# Patient Record
Sex: FEMALE | Race: BLACK OR AFRICAN AMERICAN
[De-identification: names, ages, dates, MRNs, and addresses within clinical notes are randomized per-mention and may not be internally consistent; named-entity substitution may affect disease eponyms.]

---

## 2018-07-20 ENCOUNTER — HOSPITAL ENCOUNTER (EMERGENCY)
Dept: HOSPITAL 26 - MED | Age: 28
Discharge: HOME | End: 2018-07-20
Payer: COMMERCIAL

## 2018-07-20 VITALS — DIASTOLIC BLOOD PRESSURE: 95 MMHG | SYSTOLIC BLOOD PRESSURE: 138 MMHG

## 2018-07-20 VITALS — WEIGHT: 145 LBS | HEIGHT: 63 IN | BODY MASS INDEX: 25.69 KG/M2

## 2018-07-20 VITALS — DIASTOLIC BLOOD PRESSURE: 108 MMHG | SYSTOLIC BLOOD PRESSURE: 153 MMHG

## 2018-07-20 DIAGNOSIS — M26.601: ICD-10-CM

## 2018-07-20 DIAGNOSIS — I10: ICD-10-CM

## 2018-07-20 DIAGNOSIS — M62.838: Primary | ICD-10-CM

## 2018-07-20 PROCEDURE — 99283 EMERGENCY DEPT VISIT LOW MDM: CPT

## 2018-07-20 PROCEDURE — 96372 THER/PROPH/DIAG INJ SC/IM: CPT

## 2018-07-20 NOTE — NUR
Patient discharged with v/s stable. Written and verbal after care instructions 
given and explained. 

Patient alert, oriented and verbalized understanding of instructions. 
Ambulatory with steady gait. All questions addressed prior to discharge. ID 
band removed. Patient advised to follow up with PMD. Rx of ROBAXIN/  ---NO 
NORCO GIVEN AS PT REFUSED given. Patient educated on indication of medication 
including possible reaction and side effects. Opportunity to ask questions 
provided and answered.

## 2020-04-15 ENCOUNTER — HOSPITAL ENCOUNTER (EMERGENCY)
Dept: HOSPITAL 26 - MED | Age: 30
Discharge: HOME | End: 2020-04-15
Payer: COMMERCIAL

## 2020-04-15 VITALS — SYSTOLIC BLOOD PRESSURE: 146 MMHG | DIASTOLIC BLOOD PRESSURE: 116 MMHG

## 2020-04-15 VITALS — DIASTOLIC BLOOD PRESSURE: 94 MMHG | SYSTOLIC BLOOD PRESSURE: 140 MMHG

## 2020-04-15 VITALS — BODY MASS INDEX: 29.06 KG/M2 | HEIGHT: 63 IN | WEIGHT: 164 LBS

## 2020-04-15 DIAGNOSIS — N39.0: Primary | ICD-10-CM

## 2020-04-15 DIAGNOSIS — I10: ICD-10-CM

## 2020-04-15 LAB
ALBUMIN FLD-MCNC: 3.9 G/DL (ref 3.4–5)
ANION GAP SERPL CALCULATED.3IONS-SCNC: 12.4 MMOL/L (ref 8–16)
APPEARANCE UR: CLEAR
AST SERPL-CCNC: 25 U/L (ref 15–37)
BASOPHILS # BLD AUTO: 0.1 K/UL (ref 0–0.22)
BASOPHILS NFR BLD AUTO: 1.1 % (ref 0–2)
BILIRUB SERPL-MCNC: 0.8 MG/DL (ref 0–1)
BILIRUB UR QL STRIP: NEGATIVE
BUN SERPL-MCNC: 11 MG/DL (ref 7–18)
CHLORIDE SERPL-SCNC: 101 MMOL/L (ref 98–107)
CO2 SERPL-SCNC: 27.5 MMOL/L (ref 21–32)
COLOR UR: YELLOW
CREAT SERPL-MCNC: 0.7 MG/DL (ref 0.6–1.3)
EOSINOPHIL # BLD AUTO: 0.1 K/UL (ref 0–0.4)
EOSINOPHIL NFR BLD AUTO: 1.2 % (ref 0–4)
ERYTHROCYTE [DISTWIDTH] IN BLOOD BY AUTOMATED COUNT: 13.4 % (ref 11.6–13.7)
GFR SERPL CREATININE-BSD FRML MDRD: 126 ML/MIN (ref 90–?)
GLUCOSE SERPL-MCNC: 84 MG/DL (ref 74–106)
GLUCOSE UR STRIP-MCNC: NEGATIVE MG/DL
HCT VFR BLD AUTO: 37.5 % (ref 36–48)
HGB BLD-MCNC: 12.8 G/DL (ref 12–16)
HGB UR QL STRIP: NEGATIVE
LEUKOCYTE ESTERASE UR QL STRIP: (no result)
LIPASE SERPL-CCNC: 96 U/L (ref 73–393)
LYMPHOCYTES # BLD AUTO: 2.2 K/UL (ref 2.5–16.5)
LYMPHOCYTES NFR BLD AUTO: 33.8 % (ref 20.5–51.1)
MCH RBC QN AUTO: 30 PG (ref 27–31)
MCHC RBC AUTO-ENTMCNC: 34 G/DL (ref 33–37)
MCV RBC AUTO: 86.9 FL (ref 80–94)
MONOCYTES # BLD AUTO: 0.4 K/UL (ref 0.8–1)
MONOCYTES NFR BLD AUTO: 6.6 % (ref 1.7–9.3)
NEUTROPHILS # BLD AUTO: 3.7 K/UL (ref 1.8–7.7)
NEUTROPHILS NFR BLD AUTO: 57.3 % (ref 42.2–75.2)
NITRITE UR QL STRIP: NEGATIVE
PH UR STRIP: 6 [PH] (ref 5–9)
PLATELET # BLD AUTO: 281 K/UL (ref 140–450)
POTASSIUM SERPL-SCNC: 3.9 MMOL/L (ref 3.5–5.1)
RBC # BLD AUTO: 4.32 MIL/UL (ref 4.2–5.4)
RBC #/AREA URNS HPF: (no result) /HPF (ref 0–5)
SODIUM SERPL-SCNC: 137 MMOL/L (ref 136–145)
WBC # BLD AUTO: 6.4 K/UL (ref 4.8–10.8)
WBC,URINE: (no result) /HPF (ref 0–5)

## 2020-04-15 PROCEDURE — 85025 COMPLETE CBC W/AUTO DIFF WBC: CPT

## 2020-04-15 PROCEDURE — 87086 URINE CULTURE/COLONY COUNT: CPT

## 2020-04-15 PROCEDURE — 81001 URINALYSIS AUTO W/SCOPE: CPT

## 2020-04-15 PROCEDURE — 76856 US EXAM PELVIC COMPLETE: CPT

## 2020-04-15 PROCEDURE — 36415 COLL VENOUS BLD VENIPUNCTURE: CPT

## 2020-04-15 PROCEDURE — 83690 ASSAY OF LIPASE: CPT

## 2020-04-15 PROCEDURE — 93976 VASCULAR STUDY: CPT

## 2020-04-15 PROCEDURE — 80053 COMPREHEN METABOLIC PANEL: CPT

## 2020-04-15 PROCEDURE — 96372 THER/PROPH/DIAG INJ SC/IM: CPT

## 2020-04-15 PROCEDURE — 87210 SMEAR WET MOUNT SALINE/INK: CPT

## 2020-04-15 PROCEDURE — 81025 URINE PREGNANCY TEST: CPT

## 2020-04-15 PROCEDURE — 99284 EMERGENCY DEPT VISIT MOD MDM: CPT

## 2020-04-15 PROCEDURE — 87491 CHLMYD TRACH DNA AMP PROBE: CPT

## 2020-04-15 RX ADMIN — KETOROLAC TROMETHAMINE ONE MG: 30 INJECTION, SOLUTION INTRAMUSCULAR; INTRAVENOUS at 15:21

## 2020-04-15 NOTE — NUR
30 Y/O F C/C BURNING PAIN RIGHT LOWER ABDOMINAL REGION RADIATING TO RIGHT 
THIGH/BACK AREA, 9/10 PAIN, NAUSEA. PER PT PREVIOUS HX OF UTI, HAS BEEN TO 
URGENT CARE TWICE, THIRD VISIT URGENT CARE REFERRED TO ER. PT CURRENTLY ON 
PRESCRIBED ABX FROM URGENT CARE STATES THERE IS NO RELIEF. PT NKA. HX OVARYAN 
CYST. RX ABX, DOES NOT RECALL NAME. PT DENIES V/D. SIDE RAIL X1.

## 2020-04-15 NOTE — NUR
Patient discharged with v/s stable. Written and verbal after care instructions 
given and explained. 

Patient alert, oriented and verbalized understanding of instructions. 
Ambulatory with steady gait. All questions addressed prior to discharge. ID 
band removed. Patient advised to follow up with PMD. Rx of KEFLEX, IBUPROFEN, 
PHENAZOPYRIDINE given. Patient educated on indication of medication including 
possible reaction and side effects. Opportunity to ask questions provided and 
answered.

## 2024-06-26 NOTE — NUR
Pt ambulated to [facility]. Awaiting bed availability   [id number] [employer]. All rights reserved. This information is not intended as a substitute for professional medical care. Always follow your healthcare professional's instructions.